# Patient Record
Sex: MALE | Race: WHITE | NOT HISPANIC OR LATINO | Employment: UNEMPLOYED | URBAN - METROPOLITAN AREA
[De-identification: names, ages, dates, MRNs, and addresses within clinical notes are randomized per-mention and may not be internally consistent; named-entity substitution may affect disease eponyms.]

---

## 2023-03-06 ENCOUNTER — OFFICE VISIT (OUTPATIENT)
Dept: PRIMARY CARE | Facility: CLINIC | Age: 78
End: 2023-03-06
Payer: MEDICARE

## 2023-03-06 VITALS
DIASTOLIC BLOOD PRESSURE: 68 MMHG | HEIGHT: 68 IN | SYSTOLIC BLOOD PRESSURE: 102 MMHG | BODY MASS INDEX: 27.65 KG/M2 | WEIGHT: 182.4 LBS

## 2023-03-06 DIAGNOSIS — I10 HYPERTENSION, UNSPECIFIED TYPE: ICD-10-CM

## 2023-03-06 DIAGNOSIS — R21 RASH: ICD-10-CM

## 2023-03-06 DIAGNOSIS — B02.8 HERPES ZOSTER WITH COMPLICATION: Primary | ICD-10-CM

## 2023-03-06 PROBLEM — H61.23 HEARING LOSS DUE TO CERUMEN IMPACTION, BILATERAL: Status: ACTIVE | Noted: 2023-03-06

## 2023-03-06 PROBLEM — R68.83 CHILLS: Status: ACTIVE | Noted: 2023-03-06

## 2023-03-06 PROBLEM — N40.0 BPH (BENIGN PROSTATIC HYPERPLASIA): Status: ACTIVE | Noted: 2023-03-06

## 2023-03-06 PROBLEM — R53.81 MALAISE AND FATIGUE: Status: ACTIVE | Noted: 2023-03-06

## 2023-03-06 PROBLEM — R53.83 MALAISE AND FATIGUE: Status: ACTIVE | Noted: 2023-03-06

## 2023-03-06 PROBLEM — G45.9 INTERMITTENT CEREBRAL ISCHEMIA: Status: ACTIVE | Noted: 2023-03-06

## 2023-03-06 PROBLEM — M54.50 RIGHT-SIDED LOW BACK PAIN WITHOUT SCIATICA: Status: ACTIVE | Noted: 2023-03-06

## 2023-03-06 PROBLEM — R73.02 IGT (IMPAIRED GLUCOSE TOLERANCE): Status: ACTIVE | Noted: 2023-03-06

## 2023-03-06 PROBLEM — M54.30 SCIATICA: Status: ACTIVE | Noted: 2023-03-06

## 2023-03-06 PROBLEM — K57.92 DIVERTICULITIS: Status: ACTIVE | Noted: 2023-03-06

## 2023-03-06 PROBLEM — R94.6 ABNORMAL THYROID EXAM: Status: ACTIVE | Noted: 2023-03-06

## 2023-03-06 PROBLEM — Z86.73 HISTORY OF TIA (TRANSIENT ISCHEMIC ATTACK): Status: ACTIVE | Noted: 2023-03-06

## 2023-03-06 PROBLEM — R10.32 LEFT LOWER QUADRANT ABDOMINAL PAIN OF UNKNOWN ETIOLOGY: Status: ACTIVE | Noted: 2023-03-06

## 2023-03-06 PROCEDURE — 3078F DIAST BP <80 MM HG: CPT | Performed by: EMERGENCY MEDICINE

## 2023-03-06 PROCEDURE — 1159F MED LIST DOCD IN RCRD: CPT | Performed by: EMERGENCY MEDICINE

## 2023-03-06 PROCEDURE — 3074F SYST BP LT 130 MM HG: CPT | Performed by: EMERGENCY MEDICINE

## 2023-03-06 PROCEDURE — 1160F RVW MEDS BY RX/DR IN RCRD: CPT | Performed by: EMERGENCY MEDICINE

## 2023-03-06 PROCEDURE — 99213 OFFICE O/P EST LOW 20 MIN: CPT | Performed by: EMERGENCY MEDICINE

## 2023-03-06 RX ORDER — RAMIPRIL 10 MG/1
1 CAPSULE ORAL DAILY
COMMUNITY
Start: 2014-04-28 | End: 2023-06-26 | Stop reason: SDUPTHER

## 2023-03-06 RX ORDER — VALACYCLOVIR HYDROCHLORIDE 1 G/1
1000 TABLET, FILM COATED ORAL 3 TIMES DAILY
Qty: 21 TABLET | Refills: 0 | Status: SHIPPED | OUTPATIENT
Start: 2023-03-06 | End: 2023-03-13

## 2023-03-06 RX ORDER — FINASTERIDE 5 MG/1
1 TABLET, FILM COATED ORAL DAILY
COMMUNITY
Start: 2013-12-05 | End: 2023-12-27 | Stop reason: SDUPTHER

## 2023-03-06 ASSESSMENT — PATIENT HEALTH QUESTIONNAIRE - PHQ9
SUM OF ALL RESPONSES TO PHQ9 QUESTIONS 1 AND 2: 0
1. LITTLE INTEREST OR PLEASURE IN DOING THINGS: NOT AT ALL
2. FEELING DOWN, DEPRESSED OR HOPELESS: NOT AT ALL

## 2023-03-06 ASSESSMENT — ENCOUNTER SYMPTOMS: FEVER: 0

## 2023-03-06 NOTE — PROGRESS NOTES
"Subjective   Patient ID: Simon Holland is a 78 y.o. male who presents for Herpes Zoster (Believes he has shingles).    Herpes Zoster  This is a new problem. The current episode started yesterday. The problem has been gradually worsening. Associated symptoms include a rash. Pertinent negatives include no fever. Associated symptoms comments: Rash along upper right arm. Patient states that pain is minimal.        Past medical history includes HTN, BPH followed by Dr. Mendiola at [facility], elevated PSA, history of TIA (30 minute expressive aphasia), mixed hyperlipidemia viral syndrome, traumatic left index amputation 2012. Podiatric surgery occurred in March 2017     Social history  Negative tobacco or drugs     Family history is noncontributory    Review of Systems   Constitutional:  Negative for fever.   Skin:  Positive for rash.   Comprehensive review of symptoms was not positive for any symptoms other than HPI        Objective   /68 (BP Location: Left arm, Patient Position: Sitting)   Ht 1.727 m (5' 8\")   Wt 82.7 kg (182 lb 6.4 oz)   BMI 27.73 kg/m²     Physical Exam  General appearance: Alert and in no acute distress  HEENT: Normal Inspection.  Neck - Normal Inspectiopn  Respiratory : No respiratory distress.   Cardiovascular: heart rate normal. No gallop  Back - normal inspection  Skin inspection: Warm, Rash present  Musculoskeletal : No deformities  Neuro : Grossly Intact  Psychiatric : Cooperative     Assessment/Plan   Diagnoses and all orders for this visit:  Herpes zoster with complication  - Valtrex ordered     Right-sided low back pain/hip pain- consistent with sciatica, improved with physical therapy      Hypertension- on ramipril. He reports that his blood pressure is well controlled. He is going to try to stop ramipril for few days and monitor his blood pressure to see if he still needs it     BPH- finasteride.      History of TIA- on aspirin     LDL slightly elevated on recent labs. States that he eats " quite healthy. Patient declines medication at this time.      Preventive care-  patient does not remember him having pneumonia or shingles shots. He declines at this time.   Thinks that he had a tetanus shot in the last 10 years.   Had a colonoscopy 2-3 years ago which was negative according to him.    Scribe Attestation  By signing my name below, I, Michael Brand   attest that this documentation has been prepared under the direction and in the presence of Gerald Reaves MD.    Provider Attestation - Scribe documentation    All medical record entries made by the Scribe were at my direction and personally dictated by me. I have reviewed the chart and agree that the record accurately reflects my personal performance of the history, physical exam, discussion and plan.

## 2023-06-26 DIAGNOSIS — I15.9 SECONDARY HYPERTENSION: ICD-10-CM

## 2023-06-26 RX ORDER — RAMIPRIL 10 MG/1
10 CAPSULE ORAL DAILY
Qty: 90 CAPSULE | Refills: 1 | Status: SHIPPED | OUTPATIENT
Start: 2023-06-26 | End: 2023-12-27 | Stop reason: SDUPTHER

## 2023-10-05 ENCOUNTER — TELEPHONE (OUTPATIENT)
Dept: PRIMARY CARE | Facility: CLINIC | Age: 78
End: 2023-10-05

## 2023-10-06 DIAGNOSIS — D64.9 ANEMIA, UNSPECIFIED TYPE: ICD-10-CM

## 2023-10-06 DIAGNOSIS — Z00.00 ROUTINE GENERAL MEDICAL EXAMINATION AT A HEALTH CARE FACILITY: ICD-10-CM

## 2023-10-06 DIAGNOSIS — E03.9 HYPOTHYROIDISM, UNSPECIFIED TYPE: ICD-10-CM

## 2023-10-06 DIAGNOSIS — R73.02 IGT (IMPAIRED GLUCOSE TOLERANCE): ICD-10-CM

## 2023-10-06 DIAGNOSIS — E78.5 DYSLIPIDEMIA: ICD-10-CM

## 2023-10-09 ENCOUNTER — LAB (OUTPATIENT)
Dept: LAB | Facility: LAB | Age: 78
End: 2023-10-09
Payer: MEDICARE

## 2023-10-09 DIAGNOSIS — D64.9 ANEMIA, UNSPECIFIED TYPE: ICD-10-CM

## 2023-10-09 DIAGNOSIS — E03.9 HYPOTHYROIDISM, UNSPECIFIED TYPE: ICD-10-CM

## 2023-10-09 DIAGNOSIS — R73.02 IGT (IMPAIRED GLUCOSE TOLERANCE): ICD-10-CM

## 2023-10-09 DIAGNOSIS — Z00.00 ROUTINE GENERAL MEDICAL EXAMINATION AT A HEALTH CARE FACILITY: ICD-10-CM

## 2023-10-09 DIAGNOSIS — E78.5 DYSLIPIDEMIA: ICD-10-CM

## 2023-10-09 LAB
ALBUMIN SERPL BCP-MCNC: 4.1 G/DL (ref 3.4–5)
ALP SERPL-CCNC: 50 U/L (ref 33–136)
ALT SERPL W P-5'-P-CCNC: 18 U/L (ref 10–52)
ANION GAP SERPL CALC-SCNC: 13 MMOL/L (ref 10–20)
AST SERPL W P-5'-P-CCNC: 15 U/L (ref 9–39)
BASOPHILS # BLD AUTO: 0.06 X10*3/UL (ref 0–0.1)
BASOPHILS NFR BLD AUTO: 1 %
BILIRUB SERPL-MCNC: 0.4 MG/DL (ref 0–1.2)
BUN SERPL-MCNC: 18 MG/DL (ref 6–23)
CALCIUM SERPL-MCNC: 9.3 MG/DL (ref 8.6–10.6)
CHLORIDE SERPL-SCNC: 102 MMOL/L (ref 98–107)
CHOLEST SERPL-MCNC: 205 MG/DL (ref 0–199)
CHOLESTEROL/HDL RATIO: 5.8
CO2 SERPL-SCNC: 27 MMOL/L (ref 21–32)
CREAT SERPL-MCNC: 1.17 MG/DL (ref 0.5–1.3)
EOSINOPHIL # BLD AUTO: 0.27 X10*3/UL (ref 0–0.4)
EOSINOPHIL NFR BLD AUTO: 4.4 %
ERYTHROCYTE [DISTWIDTH] IN BLOOD BY AUTOMATED COUNT: 13.6 % (ref 11.5–14.5)
EST. AVERAGE GLUCOSE BLD GHB EST-MCNC: 111 MG/DL
GFR SERPL CREATININE-BSD FRML MDRD: 63 ML/MIN/1.73M*2
GLUCOSE SERPL-MCNC: 111 MG/DL (ref 74–99)
HBA1C MFR BLD: 5.5 %
HCT VFR BLD AUTO: 44.2 % (ref 41–52)
HDLC SERPL-MCNC: 35.5 MG/DL
HGB BLD-MCNC: 14.5 G/DL (ref 13.5–17.5)
IMM GRANULOCYTES # BLD AUTO: 0.02 X10*3/UL (ref 0–0.5)
IMM GRANULOCYTES NFR BLD AUTO: 0.3 % (ref 0–0.9)
LDLC SERPL CALC-MCNC: 129 MG/DL (ref 140–190)
LYMPHOCYTES # BLD AUTO: 2.06 X10*3/UL (ref 0.8–3)
LYMPHOCYTES NFR BLD AUTO: 33.8 %
MCH RBC QN AUTO: 29.2 PG (ref 26–34)
MCHC RBC AUTO-ENTMCNC: 32.8 G/DL (ref 32–36)
MCV RBC AUTO: 89 FL (ref 80–100)
MONOCYTES # BLD AUTO: 0.54 X10*3/UL (ref 0.05–0.8)
MONOCYTES NFR BLD AUTO: 8.9 %
NEUTROPHILS # BLD AUTO: 3.14 X10*3/UL (ref 1.6–5.5)
NEUTROPHILS NFR BLD AUTO: 51.6 %
NON HDL CHOLESTEROL: 170 MG/DL (ref 0–149)
NRBC BLD-RTO: 0 /100 WBCS (ref 0–0)
PLATELET # BLD AUTO: 256 X10*3/UL (ref 150–450)
PMV BLD AUTO: 10.1 FL (ref 7.5–11.5)
POTASSIUM SERPL-SCNC: 4.3 MMOL/L (ref 3.5–5.3)
PROT SERPL-MCNC: 6.6 G/DL (ref 6.4–8.2)
RBC # BLD AUTO: 4.97 X10*6/UL (ref 4.5–5.9)
SODIUM SERPL-SCNC: 138 MMOL/L (ref 136–145)
TRIGL SERPL-MCNC: 203 MG/DL (ref 0–149)
TSH SERPL-ACNC: 3.1 MIU/L (ref 0.44–3.98)
VLDL: 41 MG/DL (ref 0–40)
WBC # BLD AUTO: 6.1 X10*3/UL (ref 4.4–11.3)

## 2023-10-09 PROCEDURE — 85025 COMPLETE CBC W/AUTO DIFF WBC: CPT

## 2023-10-09 PROCEDURE — 84443 ASSAY THYROID STIM HORMONE: CPT

## 2023-10-09 PROCEDURE — 80061 LIPID PANEL: CPT

## 2023-10-09 PROCEDURE — 83036 HEMOGLOBIN GLYCOSYLATED A1C: CPT

## 2023-10-09 PROCEDURE — 80053 COMPREHEN METABOLIC PANEL: CPT

## 2023-10-09 PROCEDURE — 36415 COLL VENOUS BLD VENIPUNCTURE: CPT

## 2023-10-11 ENCOUNTER — OFFICE VISIT (OUTPATIENT)
Dept: PRIMARY CARE | Facility: CLINIC | Age: 78
End: 2023-10-11
Payer: MEDICARE

## 2023-10-11 VITALS
BODY MASS INDEX: 29.09 KG/M2 | HEART RATE: 70 BPM | WEIGHT: 181 LBS | DIASTOLIC BLOOD PRESSURE: 79 MMHG | OXYGEN SATURATION: 95 % | HEIGHT: 66 IN | SYSTOLIC BLOOD PRESSURE: 125 MMHG

## 2023-10-11 DIAGNOSIS — M54.50 RIGHT-SIDED LOW BACK PAIN WITHOUT SCIATICA, UNSPECIFIED CHRONICITY: ICD-10-CM

## 2023-10-11 DIAGNOSIS — R73.02 IGT (IMPAIRED GLUCOSE TOLERANCE): ICD-10-CM

## 2023-10-11 DIAGNOSIS — Z00.00 WELLNESS EXAMINATION: Primary | ICD-10-CM

## 2023-10-11 DIAGNOSIS — N40.0 BENIGN PROSTATIC HYPERPLASIA, UNSPECIFIED WHETHER LOWER URINARY TRACT SYMPTOMS PRESENT: ICD-10-CM

## 2023-10-11 PROCEDURE — 3074F SYST BP LT 130 MM HG: CPT | Performed by: EMERGENCY MEDICINE

## 2023-10-11 PROCEDURE — G0446 INTENS BEHAVE THER CARDIO DX: HCPCS | Performed by: EMERGENCY MEDICINE

## 2023-10-11 PROCEDURE — G0439 PPPS, SUBSEQ VISIT: HCPCS | Performed by: EMERGENCY MEDICINE

## 2023-10-11 PROCEDURE — 1160F RVW MEDS BY RX/DR IN RCRD: CPT | Performed by: EMERGENCY MEDICINE

## 2023-10-11 PROCEDURE — 99397 PER PM REEVAL EST PAT 65+ YR: CPT | Performed by: EMERGENCY MEDICINE

## 2023-10-11 PROCEDURE — 3078F DIAST BP <80 MM HG: CPT | Performed by: EMERGENCY MEDICINE

## 2023-10-11 PROCEDURE — 1036F TOBACCO NON-USER: CPT | Performed by: EMERGENCY MEDICINE

## 2023-10-11 PROCEDURE — 1159F MED LIST DOCD IN RCRD: CPT | Performed by: EMERGENCY MEDICINE

## 2023-10-11 PROCEDURE — G0444 DEPRESSION SCREEN ANNUAL: HCPCS | Performed by: EMERGENCY MEDICINE

## 2023-10-11 PROCEDURE — G0442 ANNUAL ALCOHOL SCREEN 15 MIN: HCPCS | Performed by: EMERGENCY MEDICINE

## 2023-10-11 PROCEDURE — 99497 ADVNCD CARE PLAN 30 MIN: CPT | Performed by: EMERGENCY MEDICINE

## 2023-10-11 NOTE — PROGRESS NOTES
medicare wellness and physical      **History of Present Illness  The patient is being seen for the subsequent annual wellness visit.   Past Medical, Surgical and Family History: reviewed and updated in chart.   Medications and Supplements: Review of all medications by a prescribing practitioner or clinical pharmacist (such as prescriptions, OTCs, herbal therapies and supplements) documented in the medical record.    No, the patient is not using opioids.   Health Risk Assessment:. Paper HRA completed by patient and scanned into chart.   Patient Self Assessment of Health Status: excellent.   Tobacco use: Non-User The patient has never smoked cigarettes.   Alcohol use: Non-User, As noted in social history   Illicit drug use: Non-User   Current diet: well balanced diet and does not consume adequate fluids.   Exercise Frequency: the patient does not exercise.   Depression/Suicide Screening:  .   During the past 2 weeks, the patient has not felt down, depressed or hopeless.    During the past 2 weeks, the patient has not felt little interest or pleasure in doing things.    Hearing Impairment: none.   Cognitive Impairment: No cognitive impairment observed.      Bathing: performs independently.   Dressing: performs independently.   Walking: performs independently.   Toileting: performs independently.   Feeding: performs independently.   Personal Hygiene: performs independently.   Bowels: continent.   Bladder: continent.   Managing Finances: performs independently.   Shopping: performs independently.   Managing Medications: performs independently.   Housework / Basic Home Maintenance: performs independently.   Handling Transportation: performs independently.   Preparing Meals: performs independently.   Using the Telephone/ Communication Devices: performs independently.    Falls Risk Screening:. MARQUES has not fallen in the last 6 months.   Home safety risk factors: none.   Advance directives:. Advanced Care Planning  discussed and documented advance care plan or surrogate decision maker documented in the medical record.   A Conversation about Advance directives of at least 16 minutes has occurred. Actual time spent: 20   Topics discussed: Patient already has a DO NOT RESUSCITATE.     78 -year- old for Medicare wellness and physical      Patient has been experiencing right hip pain originating in his low back and shooting down his leg. He states that the pain is constant throughout the day, however he does not have pain at night and sleeps well. Tylenol provides pain relief.      Past medical history includes HTN, BPH followed by Dr. Mendiola at [facility], elevated PSA, history of TIA (30 minute expressive aphasia), mixed hyperlipidemia viral syndrome, traumatic left index amputation 2012. Podiatric surgery occurred in March 2017     Social history  Negative tobacco or drugs     Family history is noncontributory        Review of Systems  All eleven systems were reviewed with the patient and were negative for any symptoms other than what is documented in the history of present illness.        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, bilateral (389.8,380.4) (H61.23)   · History of TIA (transient ischemic attack) (V12.54) (Z86.73)   · HTN (hypertension) (401.9) (I10)   · IGT (impaired glucose tolerance) (790.22) (R73.02)   · Intermittent cerebral ischemia (435.9) (G45.9)   · Left lower quadrant abdominal pain of unknown etiology (789.04) (R10.32)   · Malaise and fatigue (780.79) (R53.81,R53.83)   · Rash (782.1) (R21)   · Right-sided low back pain without sciatica (724.2) (M54.50)   · Screening for rectal cancer (V76.41) (Z12.12)     Past Medical History     · History of hypertension  (V12.59) (Z86.79)     Surgical History     · History of Foot Surgery   · History of Nasal Septal Deviation Repair   · History of Therapeutic Cystoscopy     Family History     · Family history of lung cancer (V16.1) (Z80.1)     · Family history of lung cancer (V16.1) (Z80.1)     Social History     · Daily caffeine consumption, 1 serving a day   ·    · Non-smoker (V49.89) (Z78.9)   · Occasional alcohol use   · Retired     **Health Management  Health Maintenance   Cologuard; every 2 years; Next Permanently Deferred;   Fall prevention, home; every 1 year; Last 60Ieb2829; Next Due: 56Yer4799; Overdue  Health Maintenance Exam; every 1 year; Last 14Fvx4017; Next Due: 10Tbn4967; Overdue  IO PHevery9 Depression Screening; every 1 year; Last 31Zof2295; Next Due: 00Pgh5398; Overdue  Prevnar 13 Intramuscular Suspension; every 95 years; Next Permanently Deferred; Permanent  Deferral     *Allergies     · No Known Drug Allergies   Recorded By: Kate Peace; 4/28/2014 3:42:21 PM     *Current Meds     Medication Name Instruction   Finasteride 5 MG Oral Tablet TAKE 1 TABLET DAILY.   Ramipril 10 MG Oral Capsule TAKE 1 CAPSULE ONCE DAILY.      Immunizations  Pfizer-BioNTOptosecurity COVID-19 Vacc 30 MCG/0.3ML Intramuscular Suspension --- Series1: Mar 23 2021  12:00AM; Series2: Apr 13 2021 12:00AM      Patient Care Team     Care Team Member Role Specialty Office Number   Jean Paul JOSEPH, Gerald POST Primary Care Provider Emergency Medicine [phone]   Sasha Holland     [phone]   Nixon Rinaldi DO   Internal Medicine [phone]           Physical Exam  General appearance: Alert and in no acute distress  HEENT: Normal Inspection  Neck - Normal Inspection  Respiratory : No respiratory distress. Lungs are clear   Cardiovascular: heart rate normal. No gallop  Back - normal inspection  Skin inspection:Warm  Musculoskeletal : No deformities  Neuro : Limited exam. Baseline  Psychiatric : Cooperative           *Diagnoses/Problems     ·  Encounter for preventive health examination (V70.0) (Z00.00)     · BPH (benign prostatic hyperplasia) (600.00) (N40.0)   · HTN (hypertension) (401.9) (I10)   · Right-sided low back pain without sciatica (724.2) (M54.50)   · History of TIA (transient ischemic attack) (V12.54) (Z86.73)   · Sciatica (724.3) (M54.30)     *Orders     · Start: predniSONE 20 MG Oral Tablet; TAKE 2 TABLETS DAILY WITH FOOD   Rx By: Gerald Reaves; Dispense: 7 Days ; #:14 Tablet; Refill: 0; For: Sciatica; OWEN = N; Verified Transmission to [facility] #[facility]; Last Updated By: System, [employer]; 10/7/2022 8:41:11 AM     Provider Impressions     77-year-old man for Medicare wellness and physical      Low back pain-not very bothersome.  Resolved with Tylenol or Motrin.  Will monitor     Hypertension- on ramipril. He reports that his blood pressure is well controlled.      BPH- finasteride.      History of TIA- on aspirin     LDL slightly elevated on recent labs. States that he eats quite healthy. Patient declines medication at this time.      Preventive care-  patient does not remember him having pneumonia or shingles shots. He declines at this time.   Thinks that he had a tetanus shot in the last 10 years.   Had a colonoscopy 2-3 years ago which was negative according to him.       PH Q-9 depression screening was completed by authorized employee of the practice and  explained the questionnaire and discussed the answers with the patient.    I screened for alcohol use    We had face-to-face discussion with this patient about the cardiovascular risk and behavioral therapies of nutritional choices, exercise and elimination of habits contradicting to the risk. We agreed on how they may be able to reduce their current cardiovascular risk.          Follow-up in 3 months or as needed

## 2023-12-27 DIAGNOSIS — Z00.00 ROUTINE GENERAL MEDICAL EXAMINATION AT A HEALTH CARE FACILITY: ICD-10-CM

## 2023-12-27 DIAGNOSIS — I15.9 SECONDARY HYPERTENSION: ICD-10-CM

## 2023-12-27 RX ORDER — RAMIPRIL 10 MG/1
10 CAPSULE ORAL DAILY
Qty: 90 CAPSULE | Refills: 1 | Status: SHIPPED | OUTPATIENT
Start: 2023-12-27 | End: 2024-05-06 | Stop reason: SDUPTHER

## 2023-12-27 RX ORDER — FINASTERIDE 5 MG/1
5 TABLET, FILM COATED ORAL DAILY
Qty: 90 TABLET | Refills: 1 | Status: SHIPPED | OUTPATIENT
Start: 2023-12-27 | End: 2024-05-06 | Stop reason: SDUPTHER

## 2024-05-06 DIAGNOSIS — Z00.00 ROUTINE GENERAL MEDICAL EXAMINATION AT A HEALTH CARE FACILITY: ICD-10-CM

## 2024-05-06 DIAGNOSIS — I15.9 SECONDARY HYPERTENSION: ICD-10-CM

## 2024-05-06 RX ORDER — RAMIPRIL 10 MG/1
10 CAPSULE ORAL DAILY
Qty: 90 CAPSULE | Refills: 1 | Status: SHIPPED | OUTPATIENT
Start: 2024-05-06

## 2024-05-06 RX ORDER — FINASTERIDE 5 MG/1
5 TABLET, FILM COATED ORAL DAILY
Qty: 90 TABLET | Refills: 1 | Status: SHIPPED | OUTPATIENT
Start: 2024-05-06

## 2024-10-04 ENCOUNTER — TELEPHONE (OUTPATIENT)
Dept: PRIMARY CARE | Facility: CLINIC | Age: 79
End: 2024-10-04
Payer: MEDICARE

## 2024-10-04 DIAGNOSIS — Z00.00 ROUTINE GENERAL MEDICAL EXAMINATION AT A HEALTH CARE FACILITY: ICD-10-CM

## 2024-10-04 DIAGNOSIS — E78.5 DYSLIPIDEMIA: ICD-10-CM

## 2024-10-04 DIAGNOSIS — R73.02 IGT (IMPAIRED GLUCOSE TOLERANCE): ICD-10-CM

## 2024-10-04 NOTE — TELEPHONE ENCOUNTER
Patient requesting orders for blood work prior to his appointment on 10/16/24. Tried informing pt that this will be ordered at time of visit and he said No, they get this done prior to appointment and have been the last few years.   Please advise.

## 2024-10-11 LAB
NON-UH HIE A/G RATIO: 1.2
NON-UH HIE ALB: 3.5 G/DL (ref 3.4–5)
NON-UH HIE ALK PHOS: 60 UNIT/L (ref 45–117)
NON-UH HIE BASO COUNT: 0.04 X1000 (ref 0–0.2)
NON-UH HIE BASOS %: 0.7 %
NON-UH HIE BILIRUBIN, TOTAL: 0.6 MG/DL (ref 0.3–1.2)
NON-UH HIE BUN/CREAT RATIO: 20
NON-UH HIE BUN: 24 MG/DL (ref 9–23)
NON-UH HIE CALCIUM: 9.2 MG/DL (ref 8.7–10.4)
NON-UH HIE CALCULATED LDL CHOLESTEROL: 126 MG/DL (ref 60–130)
NON-UH HIE CALCULATED OSMOLALITY: 284 MOSM/KG (ref 275–295)
NON-UH HIE CHLORIDE: 107 MMOL/L (ref 98–107)
NON-UH HIE CHOLESTEROL: 196 MG/DL (ref 100–200)
NON-UH HIE CO2, VENOUS: 29 MMOL/L (ref 20–31)
NON-UH HIE CREATININE: 1.2 MG/DL (ref 0.6–1.1)
NON-UH HIE DIFF?: NO
NON-UH HIE EOS COUNT: 0.44 X1000 (ref 0–0.5)
NON-UH HIE EOSIN %: 6.8 %
NON-UH HIE GFR AA: >60
NON-UH HIE GLOBULIN: 3 G/DL
NON-UH HIE GLOMERULAR FILTRATION RATE: 58 ML/MIN/1.73M?
NON-UH HIE GLUCOSE: 112 MG/DL (ref 74–106)
NON-UH HIE GOT: 15 UNIT/L (ref 15–37)
NON-UH HIE GPT: 13 UNIT/L (ref 10–49)
NON-UH HIE HCT: 41.8 % (ref 41–52)
NON-UH HIE HDL CHOLESTEROL: 34 MG/DL (ref 40–60)
NON-UH HIE HGB A1C: 5.3 %
NON-UH HIE HGB: 13.7 G/DL (ref 13.5–17.5)
NON-UH HIE INSTR WBC: 6.5
NON-UH HIE K: 4.6 MMOL/L (ref 3.5–5.1)
NON-UH HIE LYMPH %: 32.6 %
NON-UH HIE LYMPH COUNT: 2.1 X1000 (ref 1.2–4.8)
NON-UH HIE MCH: 28.6 PG (ref 27–34)
NON-UH HIE MCHC: 32.8 G/DL (ref 32–37)
NON-UH HIE MCV: 87.3 FL (ref 80–100)
NON-UH HIE MONO %: 8.4 %
NON-UH HIE MONO COUNT: 0.54 X1000 (ref 0.1–1)
NON-UH HIE MPV: 7 FL (ref 7.4–10.4)
NON-UH HIE NA: 140 MMOL/L (ref 135–145)
NON-UH HIE NEUTROPHIL %: 51.6 %
NON-UH HIE NEUTROPHIL COUNT (ANC): 3.33 X1000 (ref 1.4–8.8)
NON-UH HIE NUCLEATED RBC: 0 /100WBC
NON-UH HIE PLATELET: 233 X10 (ref 150–450)
NON-UH HIE RBC: 4.79 X10 (ref 4.7–6.1)
NON-UH HIE RDW: 14.7 % (ref 11.5–14.5)
NON-UH HIE TOTAL CHOL/HDL CHOL RATIO: 5.8
NON-UH HIE TOTAL PROTEIN: 6.5 G/DL (ref 5.7–8.2)
NON-UH HIE TRIGLYCERIDES: 181 MG/DL (ref 30–150)
NON-UH HIE TSH: 3.56 UIU/ML (ref 0.55–4.78)
NON-UH HIE WBC: 6.5 X10 (ref 4.5–11)

## 2024-10-16 ENCOUNTER — APPOINTMENT (OUTPATIENT)
Dept: PRIMARY CARE | Facility: CLINIC | Age: 79
End: 2024-10-16
Payer: MEDICARE

## 2024-10-16 VITALS
OXYGEN SATURATION: 98 % | DIASTOLIC BLOOD PRESSURE: 72 MMHG | HEIGHT: 68 IN | HEART RATE: 71 BPM | BODY MASS INDEX: 27.58 KG/M2 | SYSTOLIC BLOOD PRESSURE: 115 MMHG | WEIGHT: 182 LBS

## 2024-10-16 DIAGNOSIS — R73.02 IGT (IMPAIRED GLUCOSE TOLERANCE): ICD-10-CM

## 2024-10-16 DIAGNOSIS — Z23 FLU VACCINE NEED: ICD-10-CM

## 2024-10-16 DIAGNOSIS — M54.50 RIGHT-SIDED LOW BACK PAIN WITHOUT SCIATICA, UNSPECIFIED CHRONICITY: ICD-10-CM

## 2024-10-16 DIAGNOSIS — Z00.00 ENCOUNTER FOR MEDICARE ANNUAL WELLNESS EXAM: Primary | ICD-10-CM

## 2024-10-16 DIAGNOSIS — I10 HYPERTENSION, UNSPECIFIED TYPE: ICD-10-CM

## 2024-10-16 DIAGNOSIS — Z86.73 HISTORY OF TIA (TRANSIENT ISCHEMIC ATTACK): ICD-10-CM

## 2024-10-16 DIAGNOSIS — N40.0 BENIGN PROSTATIC HYPERPLASIA, UNSPECIFIED WHETHER LOWER URINARY TRACT SYMPTOMS PRESENT: ICD-10-CM

## 2024-10-16 DIAGNOSIS — Z00.00 PHYSICAL EXAM: ICD-10-CM

## 2024-10-16 PROCEDURE — 99397 PER PM REEVAL EST PAT 65+ YR: CPT | Performed by: EMERGENCY MEDICINE

## 2024-10-16 PROCEDURE — 3074F SYST BP LT 130 MM HG: CPT | Performed by: EMERGENCY MEDICINE

## 2024-10-16 PROCEDURE — 99497 ADVNCD CARE PLAN 30 MIN: CPT | Performed by: EMERGENCY MEDICINE

## 2024-10-16 PROCEDURE — 1158F ADVNC CARE PLAN TLK DOCD: CPT | Performed by: EMERGENCY MEDICINE

## 2024-10-16 PROCEDURE — 1159F MED LIST DOCD IN RCRD: CPT | Performed by: EMERGENCY MEDICINE

## 2024-10-16 PROCEDURE — 1170F FXNL STATUS ASSESSED: CPT | Performed by: EMERGENCY MEDICINE

## 2024-10-16 PROCEDURE — 1123F ACP DISCUSS/DSCN MKR DOCD: CPT | Performed by: EMERGENCY MEDICINE

## 2024-10-16 PROCEDURE — 1036F TOBACCO NON-USER: CPT | Performed by: EMERGENCY MEDICINE

## 2024-10-16 PROCEDURE — G0442 ANNUAL ALCOHOL SCREEN 15 MIN: HCPCS | Performed by: EMERGENCY MEDICINE

## 2024-10-16 PROCEDURE — G0439 PPPS, SUBSEQ VISIT: HCPCS | Performed by: EMERGENCY MEDICINE

## 2024-10-16 PROCEDURE — G0444 DEPRESSION SCREEN ANNUAL: HCPCS | Performed by: EMERGENCY MEDICINE

## 2024-10-16 PROCEDURE — 3078F DIAST BP <80 MM HG: CPT | Performed by: EMERGENCY MEDICINE

## 2024-10-16 ASSESSMENT — ACTIVITIES OF DAILY LIVING (ADL)
MANAGING_FINANCES: INDEPENDENT
DOING_HOUSEWORK: INDEPENDENT
TAKING_MEDICATION: INDEPENDENT
GROCERY_SHOPPING: INDEPENDENT
BATHING: INDEPENDENT
DRESSING: INDEPENDENT

## 2024-10-16 ASSESSMENT — PATIENT HEALTH QUESTIONNAIRE - PHQ9
2. FEELING DOWN, DEPRESSED OR HOPELESS: NOT AT ALL
2. FEELING DOWN, DEPRESSED OR HOPELESS: NOT AT ALL
1. LITTLE INTEREST OR PLEASURE IN DOING THINGS: NOT AT ALL
SUM OF ALL RESPONSES TO PHQ9 QUESTIONS 1 AND 2: 0
SUM OF ALL RESPONSES TO PHQ9 QUESTIONS 1 AND 2: 0
1. LITTLE INTEREST OR PLEASURE IN DOING THINGS: NOT AT ALL

## 2024-10-16 NOTE — PROGRESS NOTES
medicare wellness and physical      HPI -    Patient presents for Medicare wellness and physical      Patient has been experiencing right hip pain originating in his low back and shooting down his leg. He states that the pain is constant throughout the day, however he does not have pain at night and sleeps well. Tylenol provides pain relief.      Past medical history includes HTN, BPH followed by Dr. Mendiola at [facility], elevated PSA, history of TIA (30 minute expressive aphasia), mixed hyperlipidemia viral syndrome, traumatic left index amputation 2012. Podiatric surgery occurred in March 2017     Social history  Negative tobacco or drugs     Family history is noncontributory        Review of Systems  All eleven systems were reviewed with the patient and were negative for any symptoms other than what is documented in the history of present illness.        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, bilateral (389.8,380.4) (H61.23)   · History of TIA (transient ischemic attack) (V12.54) (Z86.73)   · HTN (hypertension) (401.9) (I10)   · IGT (impaired glucose tolerance) (790.22) (R73.02)   · Intermittent cerebral ischemia (435.9) (G45.9)   · Left lower quadrant abdominal pain of unknown etiology (789.04) (R10.32)   · Malaise and fatigue (780.79) (R53.81,R53.83)   · Rash (782.1) (R21)   · Right-sided low back pain without sciatica (724.2) (M54.50)   · Screening for rectal cancer (V76.41) (Z12.12)     Past Medical History     · History of hypertension (V12.59) (Z86.79)     Surgical History     · History of Foot Surgery   · History of Nasal Septal Deviation Repair   · History of Therapeutic Cystoscopy     Family History     · Family history of lung cancer (V16.1)  (Z80.1)     · Family history of lung cancer (V16.1) (Z80.1)     Social History     · Daily caffeine consumption, 1 serving a day   ·    · Non-smoker (V49.89) (Z78.9)   · Occasional alcohol use   · Retired     **Health Management  Health Maintenance   Cologuard; every 2 years; Next Permanently Deferred;   Fall prevention, home; every 1 year; Last 06Mfo6947; Next Due: 33Shy3684; Overdue  Health Maintenance Exam; every 1 year; Last 73Yim6448; Next Due: 78Rml0926; Overdue  IO PHevery9 Depression Screening; every 1 year; Last 11Lqr8969; Next Due: 26Dfu4367; Overdue  Prevnar 13 Intramuscular Suspension; every 95 years; Next Permanently Deferred; Permanent  Deferral     *Allergies     · No Known Drug Allergies   Recorded By: Kate Peace; 4/28/2014 3:42:21 PM     *Current Meds     Medication Name Instruction   Finasteride 5 MG Oral Tablet TAKE 1 TABLET DAILY.   Ramipril 10 MG Oral Capsule TAKE 1 CAPSULE ONCE DAILY.      Immunizations  Pfizer-BioNTech COVID-19 Vacc 30 MCG/0.3ML Intramuscular Suspension --- Series1: Mar 23 2021  12:00AM; Series2: Apr 13 2021 12:00AM      Patient Care Team     Care Team Member Role Specialty Office Number   Jean Paul JOSEPH, Gerald POST Primary Care Provider Emergency Medicine [phone]   Sasha Holland     [phone]   Nixon Rinaldi DO   Internal Medicine [phone]           Physical Exam  General appearance: Alert and in no acute distress  HEENT: Normal Inspection  Neck - Normal Inspection  Respiratory : No respiratory distress. Lungs are clear   Cardiovascular: heart rate normal. No gallop  Back - normal inspection  Skin inspection:Warm  Musculoskeletal : No deformities  Neuro : Limited exam. Baseline  Psychiatric : Cooperative           *Diagnoses/Problems     · Encounter for preventive health examination (V70.0) (Z00.00)     · BPH (benign prostatic hyperplasia) (600.00) (N40.0)   · HTN (hypertension) (401.9) (I10)   · Right-sided low back pain without sciatica (724.2) (M54.50)    · History of TIA (transient ischemic attack) (V12.54) (Z86.73)   · Sciatica (724.3) (M54.30)     *Orders     · Start: predniSONE 20 MG Oral Tablet; TAKE 2 TABLETS DAILY WITH FOOD   Rx By: Gerald Reaves; Dispense: 7 Days ; #:14 Tablet; Refill: 0; For: Sciatica; OWEN = N; Verified Transmission to [facility] #0217; Last Updated By: SystemNew Body MD; 10/7/2022 8:41:11 AM     Provider Impressions     Patient presents for Medicare wellness and physical     Right hip pain - Patient has persistent right hip pain, we follows up with orthopedic specialist. He states X-rays showed arthritis, he has a hip replacement scheduled.     Low back pain - not very bothersome.  Resolved with Tylenol or Motrin.  Will monitor     Hypertension - BP is well controlled, we will discontinue ramipril and continue to monitor     BPH - Controlled on finasteride.      History of TIA - on aspirin     LDL slightly elevated on recent labs. States that he eats quite healthy. Patient declines medication at this time.      Preventive care-  patient does not remember him having pneumonia or shingles shots. He declines at this time.   Thinks that he had a tetanus shot in the last 10 years.   Had a colonoscopy 2-3 years ago which was negative according to him.     PH Q-9 depression screening was completed by authorized employee of the practice for 5-10 minutes and  explained the questionnaire and discussed the answers with the patient.     Alcohol screening was completed for 5 to 10 minutes     We had face-to-face discussion up to 15 minutes with this patient about the cardiovascular risk and behavioral therapies of nutritional choices, exercise and elimination of habits contradicting to the risk. We agreed on how they may be able to reduce their current cardiovascular risk.     I discussed advanced care planning for more than 16 minutes including the explanation and discussion of advanced directives. Information and advise was also provided on DO NOT  RESUSCITATE and patient encouraged to consider this  Patient is DNR at this time.      Follow-up in 3 months or as needed

## 2025-01-07 DIAGNOSIS — Z00.00 ROUTINE GENERAL MEDICAL EXAMINATION AT A HEALTH CARE FACILITY: ICD-10-CM

## 2025-01-08 RX ORDER — FINASTERIDE 5 MG/1
5 TABLET, FILM COATED ORAL DAILY
Qty: 90 TABLET | Refills: 3 | Status: SHIPPED | OUTPATIENT
Start: 2025-01-08

## 2025-04-04 ENCOUNTER — TELEMEDICINE (OUTPATIENT)
Dept: PRIMARY CARE | Facility: CLINIC | Age: 80
End: 2025-04-04
Payer: MEDICARE

## 2025-04-04 VITALS — WEIGHT: 172 LBS | BODY MASS INDEX: 26.07 KG/M2 | HEIGHT: 68 IN

## 2025-04-04 DIAGNOSIS — N40.0 BENIGN PROSTATIC HYPERPLASIA, UNSPECIFIED WHETHER LOWER URINARY TRACT SYMPTOMS PRESENT: ICD-10-CM

## 2025-04-04 DIAGNOSIS — Z86.73 HISTORY OF TIA (TRANSIENT ISCHEMIC ATTACK): ICD-10-CM

## 2025-04-04 DIAGNOSIS — H66.90 EAR INFECTION: Primary | ICD-10-CM

## 2025-04-04 DIAGNOSIS — R21 RASH: ICD-10-CM

## 2025-04-04 PROCEDURE — 1159F MED LIST DOCD IN RCRD: CPT | Performed by: EMERGENCY MEDICINE

## 2025-04-04 PROCEDURE — 1036F TOBACCO NON-USER: CPT | Performed by: EMERGENCY MEDICINE

## 2025-04-04 PROCEDURE — 99213 OFFICE O/P EST LOW 20 MIN: CPT | Performed by: EMERGENCY MEDICINE

## 2025-04-04 RX ORDER — CLINDAMYCIN HYDROCHLORIDE 300 MG/1
300 CAPSULE ORAL 2 TIMES DAILY
Qty: 20 CAPSULE | Refills: 0 | Status: SHIPPED | OUTPATIENT
Start: 2025-04-04 | End: 2025-04-14

## 2025-04-04 RX ORDER — NEOMYCIN SULFATE, POLYMYXIN B SULFATE, HYDROCORTISONE 3.5; 10000; 1 MG/ML; [USP'U]/ML; MG/ML
2 SOLUTION/ DROPS AURICULAR (OTIC) 4 TIMES DAILY
Qty: 10 ML | Refills: 0 | Status: SHIPPED | OUTPATIENT
Start: 2025-04-04 | End: 2025-04-11

## 2025-04-04 NOTE — PROGRESS NOTES
This visit was completed virtually due to the restrictions of the COVID-19 pandemic. All issues as below were discussed and addressed but no physical exam was performed. If it was felt that the patient should be evaluated in clinic or ER, then they were directed there. The patient verbally consented to visit         Patient states that he thinks he has a ear infection, reduced hearing, drainage from the ear and pain     Past medical history includes HTN, BPH followed by Dr. Mendiola at [facility], elevated PSA, history of TIA (30 minute expressive aphasia), mixed hyperlipidemia viral syndrome, traumatic left index amputation 2012. Podiatric surgery occurred in March 2017     Social history  Negative tobacco or drugs     Family history is noncontributory        Review of Systems  All eleven systems were reviewed with the patient and were negative for any symptoms other than what is documented in the history of present illness.        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, bilateral (389.8,380.4) (H61.23)   · History of TIA (transient ischemic attack) (V12.54) (Z86.73)   · HTN (hypertension) (401.9) (I10)   · IGT (impaired glucose tolerance) (790.22) (R73.02)   · Intermittent cerebral ischemia (435.9) (G45.9)   · Left lower quadrant abdominal pain of unknown etiology (789.04) (R10.32)   · Malaise and fatigue (780.79) (R53.81,R53.83)   · Rash (782.1) (R21)   · Right-sided low back pain without sciatica (724.2) (M54.50)   · Screening for rectal cancer (V76.41) (Z12.12)     Past Medical History     · History of hypertension (V12.59) (Z86.79)     Surgical History     · History of Foot Surgery   · History of Nasal Septal Deviation Repair   · History of Therapeutic  Cystoscopy     Family History     · Family history of lung cancer (V16.1) (Z80.1)     · Family history of lung cancer (V16.1) (Z80.1)     Social History     · Daily caffeine consumption, 1 serving a day   ·    · Non-smoker (V49.89) (Z78.9)   · Occasional alcohol use   · Retired     **Health Management  Health Maintenance   Cologuard; every 2 years; Next Permanently Deferred;   Fall prevention, home; every 1 year; Last 16Ziu6750; Next Due: 55Dos7994; Overdue  Health Maintenance Exam; every 1 year; Last 86Utu4322; Next Due: 61Erg8980; Overdue  IO PHevery9 Depression Screening; every 1 year; Last 32Ygq9765; Next Due: 95Fpd9118; Overdue  Prevnar 13 Intramuscular Suspension; every 95 years; Next Permanently Deferred; Permanent  Deferral     *Allergies     · No Known Drug Allergies   Recorded By: Kate Peace; 4/28/2014 3:42:21 PM     *Current Meds     Medication Name Instruction   Finasteride 5 MG Oral Tablet TAKE 1 TABLET DAILY.   Ramipril 10 MG Oral Capsule TAKE 1 CAPSULE ONCE DAILY.      Immunizations  Pfizer-BioNTech COVID-19 Vacc 30 MCG/0.3ML Intramuscular Suspension --- Series1: Mar 23 2021  12:00AM; Series2: Apr 13 2021 12:00AM      Patient Care Team     Care Team Member Role Specialty Office Number   Gerald Reaves MD Primary Care Provider Emergency Medicine [phone]   Sasha Holland     [phone]   Nixon Rinaldi DO   Internal Medicine [phone]                    *Diagnoses/Problems     · Encounter for preventive health examination (V70.0) (Z00.00)     · BPH (benign prostatic hyperplasia) (600.00) (N40.0)   · HTN (hypertension) (401.9) (I10)   · Right-sided low back pain without sciatica (724.2) (M54.50)   · History of TIA (transient ischemic attack) (V12.54) (Z86.73)   · Sciatica (724.3) (M54.30)     *Orders     · Start: predniSONE 20 MG Oral Tablet; TAKE 2 TABLETS DAILY WITH FOOD   Rx By: Gerald Reaves; Dispense: 7 Days ; #:14 Tablet; Refill: 0; For: Sciatica; OWEN = N; Verified  Transmission to [facility] #0217; Last Updated By: System, [place]; 10/7/2022 8:41:11 AM     Provider Impressions     Patient presents for follow-up    Ear infection-clindamycin and Cortisporin otic drops    Low back pain - not very bothersome.  Resolved with Tylenol or Motrin.  Will monitor     Hypertension - BP is well controlled, we will discontinue ramipril and continue to monitor     BPH - Controlled on finasteride.      History of TIA - on aspirin     LDL slightly elevated on recent labs. States that he eats quite healthy. Patient declines medication at this time.      Preventive care-  patient does not remember him having pneumonia or shingles shots. He declines at this time.   Thinks that he had a tetanus shot in the last 10 years.   Had a colonoscopy 2-3 years ago which was negative according to him.     PH Q-9 depression screening was completed by authorized employee of the practice for 5-10 minutes and  explained the questionnaire and discussed the answers with the patient.     Alcohol screening was completed for 5 to 10 minutes     We had face-to-face discussion up to 15 minutes with this patient about the cardiovascular risk and behavioral therapies of nutritional choices, exercise and elimination of habits contradicting to the risk. We agreed on how they may be able to reduce their current cardiovascular risk.     I discussed advanced care planning for more than 16 minutes including the explanation and discussion of advanced directives. Information and advise was also provided on DO NOT RESUSCITATE and patient encouraged to consider this  Patient is DNR at this time.      Follow-up in 3 months or as needed

## 2025-10-15 ENCOUNTER — APPOINTMENT (OUTPATIENT)
Dept: PRIMARY CARE | Facility: CLINIC | Age: 80
End: 2025-10-15
Payer: MEDICARE